# Patient Record
Sex: MALE | Race: BLACK OR AFRICAN AMERICAN | Employment: UNEMPLOYED | URBAN - METROPOLITAN AREA
[De-identification: names, ages, dates, MRNs, and addresses within clinical notes are randomized per-mention and may not be internally consistent; named-entity substitution may affect disease eponyms.]

---

## 2017-03-07 ENCOUNTER — APPOINTMENT (OUTPATIENT)
Dept: GENERAL RADIOLOGY | Age: 42
End: 2017-03-07
Attending: EMERGENCY MEDICINE
Payer: SELF-PAY

## 2017-03-07 ENCOUNTER — HOSPITAL ENCOUNTER (EMERGENCY)
Age: 42
Discharge: HOME | End: 2017-03-07
Attending: EMERGENCY MEDICINE
Payer: SELF-PAY

## 2017-03-07 VITALS
WEIGHT: 143.52 LBS | HEIGHT: 68 IN | OXYGEN SATURATION: 100 % | RESPIRATION RATE: 18 BRPM | SYSTOLIC BLOOD PRESSURE: 150 MMHG | DIASTOLIC BLOOD PRESSURE: 95 MMHG | TEMPERATURE: 97.2 F | BODY MASS INDEX: 21.75 KG/M2 | HEART RATE: 61 BPM

## 2017-03-07 DIAGNOSIS — M54.50 LUMBAR PAIN WITH RADIATION DOWN LEFT LEG: Primary | ICD-10-CM

## 2017-03-07 DIAGNOSIS — M79.605 LUMBAR PAIN WITH RADIATION DOWN LEFT LEG: Primary | ICD-10-CM

## 2017-03-07 PROCEDURE — 99282 EMERGENCY DEPT VISIT SF MDM: CPT

## 2017-03-07 PROCEDURE — 72100 X-RAY EXAM L-S SPINE 2/3 VWS: CPT

## 2017-03-07 RX ORDER — PREDNISONE 10 MG/1
60 TABLET ORAL
Qty: 27 TAB | Refills: 0 | Status: SHIPPED | OUTPATIENT
Start: 2017-03-07

## 2017-03-07 RX ORDER — KETOROLAC TROMETHAMINE 30 MG/ML
60 INJECTION, SOLUTION INTRAMUSCULAR; INTRAVENOUS
Status: DISCONTINUED | OUTPATIENT
Start: 2017-03-07 | End: 2017-03-07 | Stop reason: HOSPADM

## 2017-03-07 RX ORDER — IBUPROFEN 800 MG/1
800 TABLET ORAL
Qty: 12 TAB | Refills: 0 | Status: SHIPPED | OUTPATIENT
Start: 2017-03-07

## 2017-03-07 RX ORDER — DIAZEPAM 5 MG/1
5 TABLET ORAL
Qty: 8 TAB | Refills: 0 | Status: SHIPPED | OUTPATIENT
Start: 2017-03-07

## 2017-03-07 NOTE — ED PROVIDER NOTES
HPI Comments: Hafsa Guadalupe is a 38 y.o. male with no pmhx who presents ambulatory with a friend to ED c/o constant, worsening, moderate aching back pain that onset after falling out of bed yesterday morning. Pt states the pain is exacerbated by movement, and occasionally shoots down his left leg. Pt denies any hematuria, fecal or urinary incontinence, numbness or weakness. There are no other complaints, changes or physical findings at this time. The history is provided by the patient. No  was used. No past medical history on file. No past surgical history on file. No family history on file. Social History     Social History    Marital status:      Spouse name: N/A    Number of children: N/A    Years of education: N/A     Occupational History    Not on file. Social History Main Topics    Smoking status: Not on file    Smokeless tobacco: Not on file    Alcohol use Not on file    Drug use: Not on file    Sexual activity: Not on file     Other Topics Concern    Not on file     Social History Narrative         ALLERGIES: Pcn [penicillins]    Review of Systems   Gastrointestinal:        No fecal incontinence    Genitourinary: Negative for enuresis and hematuria. Musculoskeletal: Positive for back pain. Neurological: Negative for weakness and numbness. All other systems reviewed and are negative. Vitals:    03/07/17 1004 03/07/17 1127   BP: (!) 143/93 (!) 150/95   Pulse: 66 61   Resp: 16 18   Temp: 97.2 °F (36.2 °C)    SpO2: 98% 100%   Weight: 65.1 kg (143 lb 8.3 oz)    Height: 5' 8\" (1.727 m)             Physical Exam   Vital signs and nursing notes reviewed    CONSTITUTIONAL: Alert, mild distress; well-developed; well-nourished. HEAD:  Normocephalic, atraumatic  EYES: PERRL; EOM's intact. ENTM: Nose: no rhinorrhea; Throat: no erythema or exudate, mucous membranes moist  Neck:  Supple. trachea is midline.   RESP: Chest clear, equal breath sounds. - W/R/R  CV: S1 and S2 WNL; No murmurs, gallops or rubs. 2+ radial and DP pulses bilaterally. GI: non-distended, normal bowel sounds, abdomen soft and non-tender. No masses or organomegaly. : No costo-vertebral angle tenderness. BACK:   normal appearance. tenderness along the left lumbar paraspinal muscles on palpation. No overlying abrasions, contusions, lacerations. Pain with tilt and rotation of the hip and when bending over to touch toes. UPPER EXT:  Normal inspection. no joint or soft tissue swelling  LOWER EXT: No edema, no calf tenderness. NEURO: Alert and oriented x3, 5/5 strength and light touch sensation intact in bilateral upper and lower extremities. Positive bilateral straight leg raise. SKIN: No rashes; Warm and dry  PSYCH: Normal mood, normal affect    MDM  Number of Diagnoses or Management Options  Diagnosis management comments: 38year old male with left sided back pain consistent with muscle strain vs displaced intervertebral disc in the lumbar region with mild sciatica of the left leg with history and exam not consistent with spinal cord compromise. Amount and/or Complexity of Data Reviewed  Tests in the radiology section of CPT®: reviewed and ordered  Review and summarize past medical records: yes    Patient Progress  Patient progress: stable    ED Course       Procedures      IMAGING RESULTS:      Study Result      INDICATION: Fall with acute left low back pain     COMPARISON: None     FINDINGS:      3 views of the lumbar spine submitted for review.     No lumbosacral malalignment. Vertebral body and disc heights are well-maintained  without evidence for acute fracture or any significant degenerative change. No  evidence for spondylolysis.     IMPRESSION  IMPRESSION:     No significant abnormality         MEDICATIONS GIVEN:  Medications   ketorolac (TORADOL) injection 60 mg (not administered)       IMPRESSION:  1. Lumbar pain with radiation down left leg        PLAN:  1. Discharge Medication List as of 3/7/2017 12:09 PM      START taking these medications    Details   ibuprofen (MOTRIN) 800 mg tablet Take 1 Tab by mouth every eight (8) hours as needed for Pain for up to 12 doses. , Normal, Disp-12 Tab, R-0      diazePAM (VALIUM) 5 mg tablet Take 1 Tab by mouth every eight (8) hours as needed (muscle spasm) for up to 8 doses. Max Daily Amount: 15 mg., Print, Disp-8 Tab, R-0      predniSONE (DELTASONE) 10 mg tablet Take 6 Tabs by mouth daily (with breakfast). Day 1 and 2: 60mg; Day 3: 50mg; Day 4:40mg; Day 5: 30 mg; Day 6: 20mg; Day 7: 10mg, Normal, Disp-27 Tab, R-0           2. Follow-up Information     Follow up With Details Comments Contact Info    [facility] EMERGENCY DEPT  If symptoms worsen [address] [place] [address]  [phone]        Return to ED if worse       DISCHARGE NOTE  12:06 PM  The patient has been re-evaluated and is ready for discharge. Reviewed available results with patient. Counseled pt on diagnosis and care plan. Pt has expressed understanding, and all questions have been answered. Pt agrees with plan and agrees to follow up as recommended, or return to the ED if their symptoms worsen. Discharge instructions have been provided and explained to the pt, along with reasons to return to the ED. Attestations: This note is prepared by Mc Mcclure. Frank Mcgraw, acting as Scribe for Nighat Dawson MD.    Nighat Dawson MD: The scribe's documentation has been prepared under my direction and personally reviewed by me in its entirety. I confirm that the note above accurately reflects all work, treatment, procedures, and medical decision making performed by me.

## 2017-03-07 NOTE — DISCHARGE INSTRUCTIONS
Return for new weakness in your legs, loss of bowel or bladder control, uncontrolled pain or other new concerning symptoms. Back Stretches: Exercises  Your Care Instructions  Here are some examples of exercises for stretching your back. Start each exercise slowly. Ease off the exercise if you start to have pain. Your doctor or physical therapist will tell you when you can start these exercises and which ones will work best for you. How to do the exercises  Overhead stretch    1. Stand comfortably with your feet shoulder-width apart. 2. Looking straight ahead, raise both arms over your head and reach toward the ceiling. Do not allow your head to tilt back. 3. Hold for 15 to 30 seconds, then lower your arms to your sides. 4. Repeat 2 to 4 times. Side stretch    1. Stand comfortably with your feet shoulder-width apart. 2. Raise one arm over your head, and then lean to the other side. 3. Slide your hand down your leg as you let the weight of your arm gently stretch your side muscles. Hold for 15 to 30 seconds. 4. Repeat 2 to 4 times on each side. Press-up    1. Lie on your stomach, supporting your body with your forearms. 2. Press your elbows down into the floor to raise your upper back. As you do this, relax your stomach muscles and allow your back to arch without using your back muscles. As your press up, do not let your hips or pelvis come off the floor. 3. Hold for 15 to 30 seconds, then relax. 4. Repeat 2 to 4 times. Relax and rest    1. Lie on your back with a rolled towel under your neck and a pillow under your knees. Extend your arms comfortably to your sides. 2. Relax and breathe normally. 3. Remain in this position for about 10 minutes. 4. If you can, do this 2 or 3 times each day. Follow-up care is a key part of your treatment and safety. Be sure to make and go to all appointments, and call your doctor if you are having problems.  It's also a good idea to know your test results and keep a list of the medicines you take. Where can you learn more? Go to http://ruby-martin.info/. Enter C632 in the search box to learn more about \"Back Stretches: Exercises. \"  Current as of: May 23, 2016  Content Version: 11.1  © 9998-8980 [employer]. Care instructions adapted under license by [employer] (which disclaims liability or warranty for this information). If you have questions about a medical condition or this instruction, always ask your healthcare professional. [address] any warranty or liability for your use of this information.